# Patient Record
Sex: MALE | Race: WHITE | URBAN - METROPOLITAN AREA
[De-identification: names, ages, dates, MRNs, and addresses within clinical notes are randomized per-mention and may not be internally consistent; named-entity substitution may affect disease eponyms.]

---

## 2017-08-31 ENCOUNTER — OFFICE VISIT (OUTPATIENT)
Dept: PEDIATRICS | Facility: CLINIC | Age: 37
End: 2017-08-31
Payer: COMMERCIAL

## 2017-08-31 VITALS
SYSTOLIC BLOOD PRESSURE: 110 MMHG | HEIGHT: 73 IN | WEIGHT: 188 LBS | TEMPERATURE: 98.2 F | DIASTOLIC BLOOD PRESSURE: 78 MMHG | HEART RATE: 83 BPM | OXYGEN SATURATION: 97 % | BODY MASS INDEX: 24.92 KG/M2

## 2017-08-31 DIAGNOSIS — K21.9 GASTROESOPHAGEAL REFLUX DISEASE WITHOUT ESOPHAGITIS: Primary | ICD-10-CM

## 2017-08-31 PROCEDURE — 99214 OFFICE O/P EST MOD 30 MIN: CPT | Performed by: INTERNAL MEDICINE

## 2017-08-31 RX ORDER — FAMOTIDINE 20 MG/1
20 TABLET, FILM COATED ORAL 2 TIMES DAILY
Qty: 60 TABLET | Refills: 5 | Status: SHIPPED | OUTPATIENT
Start: 2017-08-31 | End: 2017-09-26

## 2017-08-31 NOTE — MR AVS SNAPSHOT
"              After Visit Summary   8/31/2017    Dean Deleon    MRN: [id number]           Patient Information     Date Of Birth          1980        Visit Information        Provider Department      8/31/2017 1:40 PM Kenan Oliveira MD [facility]        Today's Diagnoses     Gastroesophageal reflux disease without esophagitis    -  1      Care Instructions    Avoid alcohol, caffeine, tobacco, spicy foods, citrus foods, aspirin and anti-inflammatories like ibuprofen (\"Advil\" and \"Motrin\") or naproxen (\"Aleve\").     May begin pepcid AC 20 mg twice daily.    Follow up for a physical every 1-2 years.    Kenan Oliveira MD  Internal Medicine and Pediatrics           Follow-ups after your visit        Who to contact     If you have questions or need follow up information about today's clinic visit or your schedule please contact [facility] directly at [phone].  Normal or non-critical lab and imaging results will be communicated to you by to behart, letter or phone within 4 business days after the clinic has received the results. If you do not hear from us within 7 days, please contact the clinic through to behart or phone. If you have a critical or abnormal lab result, we will notify you by phone as soon as possible.  Submit refill requests through LearnSprout or call your pharmacy and they will forward the refill request to us. Please allow 3 business days for your refill to be completed.          Additional Information About Your Visit        to behart Information     LearnSprout gives you secure access to your electronic health record. If you see a primary care provider, you can also send messages to your care team and make appointments. If you have questions, please call your primary care clinic.  If you do not have a primary care provider, please call [phone] and they will assist you.        Care EveryWhere ID     This is your Care EveryWhere ID. This could be used by other " "organizations to access your [facility] medical records  [id number]        Your Vitals Were     Pulse Temperature Height Pulse Oximetry BMI (Body Mass Index)       83 98.2  F (36.8  C) (Oral) 6' 1\" (1.854 m) 97% 24.8 kg/m2        Blood Pressure from Last 3 Encounters:   08/31/17 110/78   05/05/14 122/68   02/01/14 118/58    Weight from Last 3 Encounters:   08/31/17 188 lb (85.3 kg)   05/05/14 160 lb (72.6 kg)   02/01/14 156 lb (70.8 kg)              Today, you had the following     No orders found for display         Today's Medication Changes          These changes are accurate as of: 8/31/17  2:14 PM.  If you have any questions, ask your nurse or doctor.               Start taking these medicines.        Dose/Directions    famotidine 20 MG tablet   Commonly known as:  PEPCID   Used for:  Gastroesophageal reflux disease without esophagitis   Started by:  Kenan Oliveira MD        Dose:  20 mg   Take 1 tablet (20 mg) by mouth 2 times daily   Quantity:  60 tablet   Refills:  5            Where to get your medicines      These medications were sent to [facility] - [address]  [address]  [address], [place] [address]     Phone:  [phone]     famotidine 20 MG tablet                Primary Care Provider Office Phone # Fax #    Anish Cas Mcknight [phone]       [address]  [facility] [address]        Equal Access to Services     [facility]: Hadii aad ku hadasho Soomaali, waaxda luqadaha, qaybta kaalmada adeegyada, waxneha edison jade . So [facility] [phone].    ATENCIÓN: Si habla español, tiene a mclaughlin disposición servicios gratuitos de asistencia lingüística. Llame al [phone].    We comply with applicable federal civil rights laws and [place] laws. We do not discriminate on the basis of race, color, national origin, age, disability sex, sexual orientation or gender identity.            Thank you!     Thank you for " choosing [facility]  for your care. Our goal is always to provide you with excellent care. Hearing back from our patients is one way we can continue to improve our services. Please take a few minutes to complete the written survey that you may receive in the mail after your visit with us. Thank you!             Your Updated Medication List - Protect others around you: Learn how to safely use, store and throw away your medicines at www.disposemymeds.org.          This list is accurate as of: 8/31/17  2:14 PM.  Always use your most recent med list.                   Brand Name Dispense Instructions for use Diagnosis    famotidine 20 MG tablet    PEPCID    60 tablet    Take 1 tablet (20 mg) by mouth 2 times daily    Gastroesophageal reflux disease without esophagitis

## 2017-08-31 NOTE — PROGRESS NOTES
SUBJECTIVE:   Dean Deleon is a 37 year old male who presents to clinic today for the following health issues:      Heartburn      Duration: Six months    Description (location/character/radiation): heartburn    Intensity:  moderate    Accompanying signs and symptoms: frequent throat clearing/cough. No globus sensation or heartburn at night time.   food getting stuck: no   nausea/vomiting/blood: no   abdominal pain: no   black/tarry or bloody stools: no :    History (similar episodes/previous evaluation): None    Precipitating or alleviating factors:  worse with caffeinated drinks and alcohol which he has decreased  current NSAID/Aspirin use: no     Therapies tried and outcome: OTC Prilosec somewhat helfpul, and Zantac (Ranitidine) and Pepcid (famotidine) not helpful        Has had some heartburn symptoms; this past year has worsened.  Feels heartburn in chest, no lower abd pain.  No known triggers.  Feels like coffee and alcohol sometimes worsen, but not all the time.  Cutting back on both of these has not made much of a difference.  Has tried zantac. Pepcid AC does help, and prilosec (omeprazole) does help.     Also has had no reflux symptoms at night.  Usually happens in the AM, or after lunch.      No vomiting or black stool.  No unexpected weight loss. No family history of gastroenterology cancer.     Problem list and histories reviewed & adjusted, as indicated.  Additional history: as documented    Patient Active Problem List   Diagnosis   (none) - all problems resolved or deleted     Past Surgical History:   Procedure Laterality Date     HC TOOTH EXTRACTION W/FORCEP         Social History   Substance Use Topics     Smoking status: Former Smoker     Packs/day: 0.50     Years: 2.00     Types: Cigarettes     Quit date: 9/6/2004     Smokeless tobacco: Never Used     Alcohol use 4.0 oz/week     8 Standard drinks or equivalent per week      Comment: occ/ about 2-6 drinks about 4/days/week      "Family History   Problem Relation Age of Onset     Hypertension Father      Depression Father      DIABETES Paternal Grandfather      type 2     Arthritis Paternal Grandfather      Alzheimer Disease Paternal Grandmother      Arthritis Paternal Grandmother      Arthritis Maternal Grandfather      JAQUELINE. Maternal Grandmother      Arthritis Maternal Grandmother          Current Outpatient Prescriptions   Medication Sig Dispense Refill     famotidine (PEPCID) 20 MG tablet Take 1 tablet (20 mg) by mouth 2 times daily 60 tablet 5     Allergies   Allergen Reactions     No Known Drug Allergies      BP Readings from Last 3 Encounters:   08/31/17 110/78   05/05/14 122/68   02/01/14 118/58    Wt Readings from Last 3 Encounters:   08/31/17 188 lb (85.3 kg)   05/05/14 160 lb (72.6 kg)   02/01/14 156 lb (70.8 kg)                  Labs reviewed in EPIC        Reviewed and updated as needed this visit by clinical staffTobacco  Allergies  Meds  Med Hx  Surg Hx  Fam Hx  Soc Hx      Reviewed and updated as needed this visit by Provider         ROS:  C: NEGATIVE for fever, chills, change in weight  E/M: NEGATIVE for ear, mouth and throat problems  R: NEGATIVE for significant cough or SOB  CV: NEGATIVE for chest pain, palpitations or peripheral edema    OBJECTIVE:                                                    /78 (BP Location: Right arm, Cuff Size: Adult Regular)  Pulse 83  Temp 98.2  F (36.8  C) (Oral)  Ht 6' 1\" (1.854 m)  Wt 188 lb (85.3 kg)  SpO2 97%  BMI 24.8 kg/m2  Body mass index is 24.8 kg/(m^2).   GENERAL: healthy, alert, well nourished, well hydrated, no distress  HENT: ear canals- normal; TMs- normal; Nose- normal; Mouth- no ulcers, no lesions  NECK: no tenderness, no adenopathy, no asymmetry, no masses, no stiffness; thyroid- normal to palpation  RESP: lungs clear to auscultation - no rales, no rhonchi, no wheezes  CV: regular rates and rhythm, normal S1 S2, no S3 or S4 and no murmur, no click or rub " "-  ABDOMEN: soft, no tenderness, no  hepatosplenomegaly, no masses, normal bowel sounds    Diagnostic test results:  Diagnostic Test Results:  none        ASSESSMENT/PLAN:                                                    1. Gastroesophageal reflux disease without esophagitis  Was told years ago had \"an ulcer\" from vicodin, but no esophagogastroduodenoscopy was done.  Advised H2 blocker daily for 3 months, followed by a trial off.  May also use proton pump inhibitor if H2 not working.  Avoid alcohol, caffeine, tobacco, spicy foods, citrus foods, aspirin and anti-inflammatories like ibuprofen (\"Advil\" and \"Motrin\") or naproxen (\"Aleve\").   Follow up for complete physcial exam this year.   Patient Instructions   Avoid alcohol, caffeine, tobacco, spicy foods, citrus foods, aspirin and anti-inflammatories like ibuprofen (\"Advil\" and \"Motrin\") or naproxen (\"Aleve\").     May begin pepcid AC 20 mg twice daily.    Follow up for a physical every 1-2 years.    Kenan Oliveira MD  Internal Medicine and Pediatrics      - famotidine (PEPCID) 20 MG tablet; Take 1 tablet (20 mg) by mouth 2 times daily  Dispense: 60 tablet; Refill: 5      See Patient Instructions    Kenan Oliveira MD  [facility]    "

## 2017-09-26 ENCOUNTER — OFFICE VISIT (OUTPATIENT)
Dept: PEDIATRICS | Facility: CLINIC | Age: 37
End: 2017-09-26
Payer: COMMERCIAL

## 2017-09-26 VITALS
HEART RATE: 103 BPM | BODY MASS INDEX: 24.52 KG/M2 | SYSTOLIC BLOOD PRESSURE: 104 MMHG | HEIGHT: 73 IN | TEMPERATURE: 99.8 F | WEIGHT: 185 LBS | OXYGEN SATURATION: 97 % | DIASTOLIC BLOOD PRESSURE: 70 MMHG

## 2017-09-26 DIAGNOSIS — R50.9 FEBRILE ILLNESS: Primary | ICD-10-CM

## 2017-09-26 PROCEDURE — 99213 OFFICE O/P EST LOW 20 MIN: CPT | Performed by: INTERNAL MEDICINE

## 2017-09-26 NOTE — PROGRESS NOTES
"  SUBJECTIVE:   Dean Deleon is a 37 year old male who presents to clinic today for the following health issues:      Pt presents with \"flu-like\" symptoms with fever (in clinic 99.8), chills/sweats, vomiting last night, and body aches.  No congestion or upper respiratory sx.  Concerned about Lyme Disease, has gone camping frequently this Summer.  Bite on left neck near collar bone, about a month a half ago from unknown insect.       Began 2 nights ago with dizziness and headache, followed by chills and bodyaches.  Lots of sweating, and last night, had some vomiting.  Has no thermometer.      First 10 hours, noted very significant fatigue; felt like had to lie down and could barely move.  Stayed home from work.   Yesterday was less tired.      Did have a bite on his left collarbone; not sure what it was.  No tick seen.      Took ibuprofen about 45 min ago. Got flu shot last week.  No known sick contacts.     Problem list and histories reviewed & adjusted, as indicated.  Additional history: as documented    Patient Active Problem List   Diagnosis   (none) - all problems resolved or deleted     Past Surgical History:   Procedure Laterality Date     HC TOOTH EXTRACTION W/FORCEP         Social History   Substance Use Topics     Smoking status: Former Smoker     Packs/day: 0.50     Years: 2.00     Types: Cigarettes     Quit date: 9/6/2004     Smokeless tobacco: Never Used     Alcohol use 4.0 oz/week     8 Standard drinks or equivalent per week      Comment: occ/ about 2-6 drinks about 4/days/week     Family History   Problem Relation Age of Onset     Hypertension Father      Depression Father      DIABETES Paternal Grandfather      type 2     Arthritis Paternal Grandfather      Alzheimer Disease Paternal Grandmother      Arthritis Paternal Grandmother      Arthritis Maternal Grandfather      C.A.D. Maternal Grandmother      Arthritis Maternal Grandmother          Current Outpatient Prescriptions " "  Medication Sig Dispense Refill     OMEPRAZOLE PO Take 20 mg by mouth every morning       Allergies   Allergen Reactions     No Known Drug Allergies      BP Readings from Last 3 Encounters:   09/26/17 104/70   08/31/17 110/78   05/05/14 122/68    Wt Readings from Last 3 Encounters:   09/26/17 185 lb (83.9 kg)   08/31/17 188 lb (85.3 kg)   05/05/14 160 lb (72.6 kg)                  Labs reviewed in EPIC        Reviewed and updated as needed this visit by clinical staffTobacco  Allergies  Meds  Med Hx  Surg Hx  Fam Hx  Soc Hx      Reviewed and updated as needed this visit by Provider         ROS:  C: NEGATIVE for fever, chills, change in weight  E/M: NEGATIVE for ear, mouth and throat problems  R: NEGATIVE for significant cough or SOB  CV: NEGATIVE for chest pain, palpitations or peripheral edema    OBJECTIVE:                                                    /70 (BP Location: Right arm, Cuff Size: Adult Regular)  Pulse 103  Temp 99.8  F (37.7  C) (Oral)  Ht 6' 1\" (1.854 m)  Wt 185 lb (83.9 kg)  SpO2 97%  BMI 24.41 kg/m2  Body mass index is 24.41 kg/(m^2).   GENERAL: healthy, alert, well nourished, well hydrated, no distress  HENT: ear canals- normal; TMs- normal; Nose- normal; Mouth- no ulcers, no lesions  NECK: no tenderness, no adenopathy, no asymmetry, no masses, no stiffness; thyroid- normal to palpation  RESP: lungs clear to auscultation - no rales, no rhonchi, no wheezes  CV: regular rates and rhythm, normal S1 S2, no S3 or S4 and no murmur, no click or rub -  ABDOMEN: soft, no tenderness, no  hepatosplenomegaly, no masses, normal bowel sounds    Diagnostic test results:  Diagnostic Test Results:  none        ASSESSMENT/PLAN:                                                    Febrile illness:  Likely viral.  No signs of serious bacterial infection.    No signs of lyme or mono.  No tick bites noted.     Patient Instructions   May take ibuprofen or tylenol.     Push fluids as needed.    Follow " up for any new symptoms or if fevers last beyond 7-10 days.    Kenan Oliveira MD  Internal Medicine and Pediatrics          See Patient Instructions    Kenan Oliveira MD  [facility]

## 2017-09-26 NOTE — PATIENT INSTRUCTIONS
May take ibuprofen or tylenol.     Push fluids as needed.    Follow up for any new symptoms or if fevers last beyond 7-10 days.    Kenan Oliveira MD  Internal Medicine and Pediatrics

## 2017-09-26 NOTE — NURSING NOTE
"Chief Complaint   Patient presents with     Generalized Body Aches       Initial /70 (BP Location: Right arm, Cuff Size: Adult Regular)  Pulse 103  Temp 99.8  F (37.7  C) (Oral)  Ht 6' 1\" (1.854 m)  Wt 185 lb (83.9 kg)  SpO2 97%  BMI 24.41 kg/m2 Estimated body mass index is 24.41 kg/(m^2) as calculated from the following:    Height as of this encounter: 6' 1\" (1.854 m).    Weight as of this encounter: 185 lb (83.9 kg).  Medication Reconciliation: complete     Ruth Marcus MA   September 26, 2017,  1:05 PM    "

## 2017-09-26 NOTE — LETTER
September 26, 2017      Dean Deleon  [address] [place] [address]        To Whom It May Concern:    Dean Deleon was seen in our clinic. He may return to work without restrictions as tolerated.      Sincerely,        Kenan Oliveira MD

## 2017-09-26 NOTE — MR AVS SNAPSHOT
"              After Visit Summary   9/26/2017    Dean Deleon    MRN: [id number]           Patient Information     Date Of Birth          1980        Visit Information        Provider Department      9/26/2017 1:00 PM [facility]        Care Instructions    May take ibuprofen or tylenol.     Push fluids as needed.    Follow up for any new symptoms or if fevers last beyond 7-10 days.    Kenan Oliveira MD  Internal Medicine and Pediatrics             Follow-ups after your visit        Who to contact     If you have questions or need follow up information about today's clinic visit or your schedule please contact [facility] directly at [phone].  Normal or non-critical lab and imaging results will be communicated to you by MyChart, letter or phone within 4 business days after the clinic has received the results. If you do not hear from us within 7 days, please contact the clinic through Oversihart or phone. If you have a critical or abnormal lab result, we will notify you by phone as soon as possible.  Submit refill requests through sCoolTV or call your pharmacy and they will forward the refill request to us. Please allow 3 business days for your refill to be completed.          Additional Information About Your Visit        MyChart Information     sCoolTV gives you secure access to your electronic health record. If you see a primary care provider, you can also send messages to your care team and make appointments. If you have questions, please call your primary care clinic.  If you do not have a primary care provider, please call [phone] and they will assist you.        Care EveryWhere ID     This is your Care EveryWhere ID. This could be used by other organizations to access your [facility] medical records  [id number]        Your Vitals Were     Pulse Temperature Height Pulse Oximetry BMI (Body Mass Index)       103 99.8  F (37.7  C) (Oral) 6' 1\" (1.854 " m) 97% 24.41 kg/m2        Blood Pressure from Last 3 Encounters:   09/26/17 104/70   08/31/17 110/78   05/05/14 122/68    Weight from Last 3 Encounters:   09/26/17 185 lb (83.9 kg)   08/31/17 188 lb (85.3 kg)   05/05/14 160 lb (72.6 kg)              Today, you had the following     No orders found for display       Primary Care Provider Office Phone # Fax #    Kenan Oliveira [phone] [address] [place] [address]        Equal Access to Services     [facility]: Hadii patricia tellez hadkenneth Sokarlene, wakarin haines, michelle kaalmagt zuñiga, ildefonso jade . So [facility] [phone].    ATENCIÓN: Si habla español, tiene a mclaughlin disposición servicios gratuitos de asistencia lingüística. Llame al [phone].    We comply with applicable federal civil rights laws and [place] laws. We do not discriminate on the basis of race, color, national origin, age, disability sex, sexual orientation or gender identity.            Thank you!     Thank you for choosing [facility]  for your care. Our goal is always to provide you with excellent care. Hearing back from our patients is one way we can continue to improve our services. Please take a few minutes to complete the written survey that you may receive in the mail after your visit with us. Thank you!             Your Updated Medication List - Protect others around you: Learn how to safely use, store and throw away your medicines at www.disposemymeds.org.          This list is accurate as of: 9/26/17  1:21 PM.  Always use your most recent med list.                   Brand Name Dispense Instructions for use Diagnosis    OMEPRAZOLE PO      Take 20 mg by mouth every morning

## 2017-09-29 ENCOUNTER — TELEPHONE (OUTPATIENT)
Dept: PEDIATRICS | Facility: CLINIC | Age: 37
End: 2017-09-29

## 2017-09-29 DIAGNOSIS — J02.0 ACUTE STREPTOCOCCAL PHARYNGITIS: Primary | ICD-10-CM

## 2017-09-29 RX ORDER — AMOXICILLIN 500 MG/1
500 CAPSULE ORAL 2 TIMES DAILY
Qty: 20 CAPSULE | Refills: 0 | Status: SHIPPED | OUTPATIENT
Start: 2017-09-29 | End: 2017-10-09

## 2017-09-29 NOTE — TELEPHONE ENCOUNTER
Pt notifies that he was seen for URI sx's on 09/26, still has the same sx's with ST(scratchy throat) from yesterday. His other sx's aren't better or worse. His 4 yr old daughter had similar sx's, took her to , she was tested positive for strep and has been on abx. So, pt would like to know whether he got strep. Requesting abx if possible. Is willing to stop by for a throat swab if needed.    Please advise. Need to notify pt. Pt can be reached at [phone](OK to ).    Notes from 09/26:  May take ibuprofen or tylenol.   Push fluids as needed.  Follow up for any new symptoms or if fevers last beyond 7-10 days.    Juan, RN  Triage Nurse

## 2017-11-06 ENCOUNTER — OFFICE VISIT (OUTPATIENT)
Dept: PEDIATRICS | Facility: CLINIC | Age: 37
End: 2017-11-06
Payer: COMMERCIAL

## 2017-11-06 VITALS
HEART RATE: 76 BPM | OXYGEN SATURATION: 96 % | HEIGHT: 73 IN | WEIGHT: 181 LBS | BODY MASS INDEX: 23.99 KG/M2 | TEMPERATURE: 98.2 F

## 2017-11-06 DIAGNOSIS — J01.01 ACUTE RECURRENT MAXILLARY SINUSITIS: Primary | ICD-10-CM

## 2017-11-06 PROCEDURE — 99214 OFFICE O/P EST MOD 30 MIN: CPT | Performed by: INTERNAL MEDICINE

## 2017-11-06 NOTE — MR AVS SNAPSHOT
"              After Visit Summary   11/6/2017    Dean Deleon    MRN: [id number]           Patient Information     Date Of Birth          1980        Visit Information        Provider Department      11/6/2017 2:20 PM Kenan Oliveira MD [facility]        Today's Diagnoses     Acute recurrent maxillary sinusitis    -  1      Care Instructions    Saline spray (nonmedicated salt water) in small squirt bottles can be used every hour or two during the day, as can humidifiers during the night.  Steam showers can help keep mucous loose.       Expectorants (like \"Mucinex\" or \"Robitussin\") may help, as can using cough supressants (like the \"DM\" in Mucinex DM and Robitussin DM).    Might benefit from antihistamines like Zyrtec (cetirizine) for relief of congestion.    Fill antibiotic if not better in next 3-5 days.    Kenan Oliveira MD  Internal Medicine and Pediatrics             Follow-ups after your visit        Who to contact     If you have questions or need follow up information about today's clinic visit or your schedule please contact [facility] directly at [phone].  Normal or non-critical lab and imaging results will be communicated to you by MyChart, letter or phone within 4 business days after the clinic has received the results. If you do not hear from us within 7 days, please contact the clinic through [employer] or phone. If you have a critical or abnormal lab result, we will notify you by phone as soon as possible.  Submit refill requests through [employer] or call your pharmacy and they will forward the refill request to us. Please allow 3 business days for your refill to be completed.          Additional Information About Your Visit        MyChart Information     [employer] gives you secure access to your electronic health record. If you see a primary care provider, you can also send messages to your care team and make appointments. If you have questions, please call your " "primary care clinic.  If you do not have a primary care provider, please call [phone] and they will assist you.        Care EveryWhere ID     This is your Care EveryWhere ID. This could be used by other organizations to access your [place] medical records  [id number]        Your Vitals Were     Pulse Temperature Height Pulse Oximetry BMI (Body Mass Index)       76 98.2  F (36.8  C) (Oral) 6' 1\" (1.854 m) 96% 23.88 kg/m2        Blood Pressure from Last 3 Encounters:   09/26/17 104/70   08/31/17 110/78   05/05/14 122/68    Weight from Last 3 Encounters:   11/06/17 181 lb (82.1 kg)   09/26/17 185 lb (83.9 kg)   08/31/17 188 lb (85.3 kg)              Today, you had the following     No orders found for display         Today's Medication Changes          These changes are accurate as of: 11/6/17  2:48 PM.  If you have any questions, ask your nurse or doctor.               Start taking these medicines.        Dose/Directions    amoxicillin-clavulanate 875-125 MG per tablet   Commonly known as:  AUGMENTIN   Used for:  Acute recurrent maxillary sinusitis   Started by:  Kenan Oliveira MD        Dose:  1 tablet   Take 1 tablet by mouth 2 times daily   Quantity:  28 tablet   Refills:  0            Where to get your medicines      Some of these will need a paper prescription and others can be bought over the counter.  Ask your nurse if you have questions.     Bring a paper prescription for each of these medications     amoxicillin-clavulanate 875-125 MG per tablet                Primary Care Provider Office Phone # Fax #    Kenan Oliveira [phone] [address] [place] [address]        Equal Access to Services     [facility]: Hadii patricia pickens Sokarlene, waaxda luqadaha, qaybta kaalmada ildefonso zuñiga. So [facility] [phone].    ATENCIÓN: Si habla español, tiene a mclaughlin disposición servicios gratuitos de asistencia lingüística. Llame al " [phone].    We comply with applicable federal civil rights laws and [place] laws. We do not discriminate on the basis of race, color, national origin, age, disability, sex, sexual orientation, or gender identity.            Thank you!     Thank you for choosing [facility]  for your care. Our goal is always to provide you with excellent care. Hearing back from our patients is one way we can continue to improve our services. Please take a few minutes to complete the written survey that you may receive in the mail after your visit with us. Thank you!             Your Updated Medication List - Protect others around you: Learn how to safely use, store and throw away your medicines at www.disposemymeds.org.          This list is accurate as of: 11/6/17  2:48 PM.  Always use your most recent med list.                   Brand Name Dispense Instructions for use Diagnosis    amoxicillin-clavulanate 875-125 MG per tablet    AUGMENTIN    28 tablet    Take 1 tablet by mouth 2 times daily    Acute recurrent maxillary sinusitis       OMEPRAZOLE PO      Take 20 mg by mouth every morning

## 2017-11-06 NOTE — PATIENT INSTRUCTIONS
"Saline spray (nonmedicated salt water) in small squirt bottles can be used every hour or two during the day, as can humidifiers during the night.  Steam showers can help keep mucous loose.       Expectorants (like \"Mucinex\" or \"Robitussin\") may help, as can using cough supressants (like the \"DM\" in Mucinex DM and Robitussin DM).    Might benefit from antihistamines like Zyrtec (cetirizine) for relief of congestion.    Fill antibiotic if not better in next 3-5 days.    Kenan Oliveira MD  Internal Medicine and Pediatrics     "

## 2017-11-06 NOTE — PROGRESS NOTES
"  SUBJECTIVE:   Dean Deleon is a 37 year old male who presents to clinic today for the following health issues:      RESPIRATORY SYMPTOMS      Duration: nine days    Description  nasal congestion, rhinorrhea, facial pain/pressure, headache and bilateral ear pressure    Severity: moderate    Accompanying signs and symptoms: None    History (predisposing factors):  none    Precipitating or alleviating factors: None    Therapies tried and outcome:  Antihistamine - somewhat helpful, saline sprays, and hot shower        Began about 9 or 10 days ago; sinus congestion. Doing saline, nettipots, and humidifiers.  3-4 days ago began to have worsening ear congestion, worse on left.  Cannot hear at work.  Nose 'feels better.\"  No fevers.  No significant headache or facial pain.  Just tightness over maxillae.  Did have some frontal sinus pain, brief.     Poor sleep.  No other sick contacts.  Daughter with milder upper respiratory infection.     No smoking.  GOt flu shot.     Problem list and histories reviewed & adjusted, as indicated.  Additional history: as documented    Patient Active Problem List   Diagnosis   (none) - all problems resolved or deleted     Past Surgical History:   Procedure Laterality Date     HC TOOTH EXTRACTION W/FORCEP         Social History   Substance Use Topics     Smoking status: Former Smoker     Packs/day: 0.50     Years: 2.00     Types: Cigarettes     Quit date: 9/6/2004     Smokeless tobacco: Never Used     Alcohol use 4.0 oz/week     8 Standard drinks or equivalent per week      Comment: occ/ about 2-6 drinks about 4/days/week     Family History   Problem Relation Age of Onset     Hypertension Father      Depression Father      DIABETES Paternal Grandfather      type 2     Arthritis Paternal Grandfather      Alzheimer Disease Paternal Grandmother      Arthritis Paternal Grandmother      Arthritis Maternal Grandfather      C.A.D. Maternal Grandmother      Arthritis Maternal " "Grandmother          Current Outpatient Prescriptions   Medication Sig Dispense Refill     amoxicillin-clavulanate (AUGMENTIN) 875-125 MG per tablet Take 1 tablet by mouth 2 times daily 28 tablet 0     OMEPRAZOLE PO Take 20 mg by mouth every morning       Allergies   Allergen Reactions     No Known Drug Allergies      BP Readings from Last 3 Encounters:   09/26/17 104/70   08/31/17 110/78   05/05/14 122/68    Wt Readings from Last 3 Encounters:   11/06/17 181 lb (82.1 kg)   09/26/17 185 lb (83.9 kg)   08/31/17 188 lb (85.3 kg)                  Labs reviewed in EPIC        Reviewed and updated as needed this visit by clinical staff     Reviewed and updated as needed this visit by Provider         ROS:  C: NEGATIVE for fever, chills, change in weight  E/M: NEGATIVE for ear, mouth and throat problems  R: NEGATIVE for significant cough or SOB  CV: NEGATIVE for chest pain, palpitations or peripheral edema    OBJECTIVE:                                                    Pulse 76  Temp 98.2  F (36.8  C) (Oral)  Ht 6' 1\" (1.854 m)  Wt 181 lb (82.1 kg)  SpO2 96%  BMI 23.88 kg/m2  Body mass index is 23.88 kg/(m^2).   GENERAL: healthy, alert, well nourished, well hydrated, no distress  HENT: ear canals- normal; TMs- normal; Nose- normal; Mouth- no ulcers, no lesions  NECK: no tenderness, no adenopathy, no asymmetry, no masses, no stiffness; thyroid- normal to palpation  RESP: lungs clear to auscultation - no rales, no rhonchi, no wheezes  CV: regular rates and rhythm, normal S1 S2, no S3 or S4 and no murmur, no click or rub -  ABDOMEN: soft, no tenderness, no  hepatosplenomegaly, no masses, normal bowel sounds    Diagnostic test results:  Diagnostic Test Results:  none        ASSESSMENT/PLAN:                                                    1. Acute recurrent maxillary sinusitis  Slowly improving . Would continue conservative management unless symptoms worsen in next 3-5 days.   Patient Instructions   Saline spray " "(nonmedicated salt water) in small squirt bottles can be used every hour or two during the day, as can humidifiers during the night.  Steam showers can help keep mucous loose.       Expectorants (like \"Mucinex\" or \"Robitussin\") may help, as can using cough supressants (like the \"DM\" in Mucinex DM and Robitussin DM).    Might benefit from antihistamines like Zyrtec (cetirizine) for relief of congestion.    Fill antibiotic if not better in next 3-5 days.    Kenan Oliveira MD  Internal Medicine and Pediatrics        - amoxicillin-clavulanate (AUGMENTIN) 875-125 MG per tablet; Take 1 tablet by mouth 2 times daily  Dispense: 28 tablet; Refill: 0      See Patient Instructions    Kenan Oliveira MD  [facility]  "

## 2017-11-06 NOTE — NURSING NOTE
"Chief Complaint   Patient presents with     RECHECK     URI       Initial Pulse 76  Temp 98.2  F (36.8  C) (Oral)  Ht 6' 1\" (1.854 m)  Wt 181 lb (82.1 kg)  SpO2 96%  BMI 23.88 kg/m2 Estimated body mass index is 23.88 kg/(m^2) as calculated from the following:    Height as of this encounter: 6' 1\" (1.854 m).    Weight as of this encounter: 181 lb (82.1 kg).  Medication Reconciliation: complete     Ruth Marcus MA   November 6, 2017,  2:33 PM    "

## 2017-11-14 ENCOUNTER — TRANSFERRED RECORDS (OUTPATIENT)
Dept: HEALTH INFORMATION MANAGEMENT | Facility: CLINIC | Age: 37
End: 2017-11-14

## 2017-11-20 ENCOUNTER — TELEPHONE (OUTPATIENT)
Dept: PEDIATRICS | Facility: CLINIC | Age: 37
End: 2017-11-20

## 2017-11-20 DIAGNOSIS — J01.01 ACUTE RECURRENT MAXILLARY SINUSITIS: ICD-10-CM

## 2017-11-20 NOTE — TELEPHONE ENCOUNTER
Patient calling that he was given a prescription for an antibiotic but has lost it and wants a refill sent to his pharmacy. Symptoms have not gotten worse but have not gotten better.  [phone]  Kathleen De Leon RN

## 2017-11-21 ENCOUNTER — OFFICE VISIT (OUTPATIENT)
Dept: PEDIATRICS | Facility: CLINIC | Age: 37
End: 2017-11-21
Payer: COMMERCIAL

## 2017-11-21 VITALS
HEIGHT: 73 IN | BODY MASS INDEX: 23.99 KG/M2 | SYSTOLIC BLOOD PRESSURE: 122 MMHG | DIASTOLIC BLOOD PRESSURE: 89 MMHG | TEMPERATURE: 98.1 F | OXYGEN SATURATION: 98 % | HEART RATE: 95 BPM | WEIGHT: 181 LBS

## 2017-11-21 DIAGNOSIS — M25.512 ACUTE PAIN OF LEFT SHOULDER: Primary | ICD-10-CM

## 2017-11-21 PROCEDURE — 99214 OFFICE O/P EST MOD 30 MIN: CPT | Performed by: INTERNAL MEDICINE

## 2017-11-21 RX ORDER — HYDROCODONE BITARTRATE AND ACETAMINOPHEN 5; 325 MG/1; MG/1
1-2 TABLET ORAL EVERY 6 HOURS PRN
Qty: 15 TABLET | Refills: 0 | Status: SHIPPED | OUTPATIENT
Start: 2017-11-21 | End: 2018-02-19

## 2017-11-21 NOTE — MR AVS SNAPSHOT
After Visit Summary   11/21/2017    Dean Deleon    MRN: [id number]           Patient Information     Date Of Birth          1980        Visit Information        Provider Department      11/21/2017 9:40 AM Kenan Oliveira MD [facility]        Today's Diagnoses     Acute pain of left shoulder    -  1      Care Instructions    Begin high dose Aleve (naproxen) 440 mg twice daily for 2 solid weeks, then may back down to 220 mg twice daily as needed.    May take hydrocodone/acetaminophen as needed for nighttime; no driving.    Call physical therapy for an appointment.    Kenan Oliveira MD  Internal Medicine and Pediatrics             Follow-ups after your visit        Additional Services     [facility] PT, HAND, AND CHIROPRACTIC REFERRAL       **This order will print in the [facility] Scheduling Office**    Physical Therapy, Hand Therapy and Chiropractic Care are available through:    *[facility]  *[facility]  *[facility]    Call one number to schedule at any of the above locations: [phone].    Your provider has referred you to: Physical Therapy at [facility] or [facility]    Indication/Reason for Referral: Shoulder Pain  Onset of Illness: 3 days  Therapy Orders: Evaluate and Treat  Special Programs: None  Special Request: None    Benedicto Diane      Additional Comments for the Therapist or Chiropractor:     Please be aware that coverage of these services is subject to the terms and limitations of your health insurance plan.  Call member services at your health plan with any benefit or coverage questions.      Please bring the following to your appointment:    *Your personal calendar for scheduling future appointments  *Comfortable clothing                  Who to contact     If you have questions or need follow up information about today's clinic visit or your schedule please contact [facility] directly at [phone].  Normal or  "non-critical lab and imaging results will be communicated to you by MyChart, letter or phone within 4 business days after the clinic has received the results. If you do not hear from us within 7 days, please contact the clinic through [employer] or phone. If you have a critical or abnormal lab result, we will notify you by phone as soon as possible.  Submit refill requests through [employer] or call your pharmacy and they will forward the refill request to us. Please allow 3 business days for your refill to be completed.          Additional Information About Your Visit        [employer] Information     [employer] gives you secure access to your electronic health record. If you see a primary care provider, you can also send messages to your care team and make appointments. If you have questions, please call your primary care clinic.  If you do not have a primary care provider, please call [phone] and they will assist you.        Care EveryWhere ID     This is your Care EveryWhere ID. This could be used by other organizations to access your [facility] medical records  [id number]        Your Vitals Were     Pulse Temperature Height Pulse Oximetry BMI (Body Mass Index)       95 98.1  F (36.7  C) (Oral) 6' 1\" (1.854 m) 98% 23.88 kg/m2        Blood Pressure from Last 3 Encounters:   11/21/17 122/89   09/26/17 104/70   08/31/17 110/78    Weight from Last 3 Encounters:   11/21/17 181 lb (82.1 kg)   11/06/17 181 lb (82.1 kg)   09/26/17 185 lb (83.9 kg)              We Performed the Following     [facility], AND CHIROPRACTIC REFERRAL          Today's Medication Changes          These changes are accurate as of: 11/21/17  9:53 AM.  If you have any questions, ask your nurse or doctor.               Start taking these medicines.        Dose/Directions    HYDROcodone-acetaminophen 5-325 MG per tablet   Commonly known as:  NORCO   Used for:  Acute pain of left shoulder   Started by:  Kenan Oliveira MD        Dose:  1-2 tablet   Take " 1-2 tablets by mouth every 6 hours as needed for moderate to severe pain maximum 2 tablet(s) per day   Quantity:  15 tablet   Refills:  0            Where to get your medicines      Some of these will need a paper prescription and others can be bought over the counter.  Ask your nurse if you have questions.     Bring a paper prescription for each of these medications     HYDROcodone-acetaminophen 5-325 MG per tablet                Primary Care Provider Office Phone # Fax #    Kenan Oliveira [phone] [address] [place] [address]        Equal Access to Services     [facility]: Hadii patricia tellez hadasho Soomaali, waaxda luqadaha, qaybta kaalmada adeegyagt, ildefonso jade . So [facility] [phone].    ATENCIÓN: Si habla español, tiene a mclaughlin disposición servicios gratuitos de asistencia lingüística. Ll[facility] [phone].    We comply with applicable federal civil rights laws and [place] laws. We do not discriminate on the basis of race, color, national origin, age, disability, sex, sexual orientation, or gender identity.            Thank you!     [facility]. Our goal is always to provide you with excellent care. Hearing back from our patients is one way we can continue to improve our services. Please take a few minutes to complete the written survey that you may receive in the mail after your visit with us. Thank you!             Your Updated Medication List - Protect others around you: Learn how to safely use, store and throw away your medicines at www.disposemymeds.org.          This list is accurate as of: 11/21/17  9:53 AM.  Always use your most recent med list.                   Brand Name Dispense Instructions for use Diagnosis    amoxicillin-clavulanate 875-125 MG per tablet    AUGMENTIN    28 tablet    Take 1 tablet by mouth 2 times daily    Acute recurrent maxillary sinusitis        HYDROcodone-acetaminophen 5-325 MG per tablet    NORCO    15 tablet    Take 1-2 tablets by mouth every 6 hours as needed for moderate to severe pain maximum 2 tablet(s) per day    Acute pain of left shoulder       OMEPRAZOLE PO      Take 20 mg by mouth every morning

## 2017-11-21 NOTE — PROGRESS NOTES
SUBJECTIVE:   Dean Deleon is a 37 year old male who presents to clinic today for the following health issues:      Injury to shoulder      Duration: three days    Description (location/character/radiation): left shoulder pain    Intensity:  moderate    Accompanying signs and symptoms: can feel pain with breathing, sniffing, or sneezing. Sleeping is difficult with pain    History (similar episodes/previous evaluation): None    Precipitating or alleviating factors: injured moving furniture a few days ago    Therapies tried and outcome: Ibuprofen helpful         3 days ago, moving furniture.  Did not notice pain right away; but later that day noted pain in left anterior shoulder.  Pain grew slowly and worsened; feels worse today.  Has range of motion, but cannot lift much.  Sleeping is painful. When reclines, will feel pain there.  No numbness or tingling.  No history of trauma.      Problem list and histories reviewed & adjusted, as indicated.  Additional history: as documented    Patient Active Problem List   Diagnosis   (none) - all problems resolved or deleted     Past Surgical History:   Procedure Laterality Date     HC TOOTH EXTRACTION W/FORCEP         Social History   Substance Use Topics     Smoking status: Former Smoker     Packs/day: 0.50     Years: 2.00     Types: Cigarettes     Quit date: 9/6/2004     Smokeless tobacco: Never Used     Alcohol use 4.0 oz/week     8 Standard drinks or equivalent per week      Comment: occ/ about 2-6 drinks about 4/days/week     Family History   Problem Relation Age of Onset     Hypertension Father      Depression Father      DIABETES Paternal Grandfather      type 2     Arthritis Paternal Grandfather      Alzheimer Disease Paternal Grandmother      Arthritis Paternal Grandmother      Arthritis Maternal Grandfather      C.A.D. Maternal Grandmother      Arthritis Maternal Grandmother          Current Outpatient Prescriptions   Medication Sig Dispense Refill  "    HYDROcodone-acetaminophen (NORCO) 5-325 MG per tablet Take 1-2 tablets by mouth every 6 hours as needed for moderate to severe pain maximum 2 tablet(s) per day 15 tablet 0     amoxicillin-clavulanate (AUGMENTIN) 875-125 MG per tablet Take 1 tablet by mouth 2 times daily 28 tablet 0     OMEPRAZOLE PO Take 20 mg by mouth every morning       Allergies   Allergen Reactions     No Known Drug Allergies      BP Readings from Last 3 Encounters:   11/21/17 122/89   09/26/17 104/70   08/31/17 110/78    Wt Readings from Last 3 Encounters:   11/21/17 181 lb (82.1 kg)   11/06/17 181 lb (82.1 kg)   09/26/17 185 lb (83.9 kg)                  Labs reviewed in EPIC        Reviewed and updated as needed this visit by clinical staffTobacco  Allergies  Meds  Med Hx  Surg Hx  Fam Hx  Soc Hx      Reviewed and updated as needed this visit by Provider         ROS:  C: NEGATIVE for fever, chills, change in weight  E/M: NEGATIVE for ear, mouth and throat problems  R: NEGATIVE for significant cough or SOB  CV: NEGATIVE for chest pain, palpitations or peripheral edema    OBJECTIVE:                                                    /89  Pulse 95  Temp 98.1  F (36.7  C) (Oral)  Ht 6' 1\" (1.854 m)  Wt 181 lb (82.1 kg)  SpO2 98%  BMI 23.88 kg/m2  Body mass index is 23.88 kg/(m^2).   GENERAL: healthy, alert, well nourished, well hydrated, no distress  HENT: ear canals- normal; TMs- normal; Nose- normal; Mouth- no ulcers, no lesions  NECK: no tenderness, no adenopathy, no asymmetry, no masses, no stiffness; thyroid- normal to palpation  RESP: lungs clear to auscultation - no rales, no rhonchi, no wheezes  CV: regular rates and rhythm, normal S1 S2, no S3 or S4 and no murmur, no click or rub -  ABDOMEN: soft, no tenderness, no  hepatosplenomegaly, no masses, normal bowel sounds  MS:  Pain along left anterior shoulder; full range of motion; no evidence of anterior subluxation or dislocation.  Shoulder Exam: Negative empty can " test, supraspinatus, infraspinatus, teres minor, and subscapularis tests.  Negative Neer's/Hawkin's test.  Full range of motion to external and internal rotation.  No bicipital tendon pain.  Normal, symmetric biceps muscles.     Diagnostic test results:  Diagnostic Test Results:  none        ASSESSMENT/PLAN:                                                    1. Acute pain of left shoulder  Patient Instructions   Begin high dose Aleve (naproxen) 440 mg twice daily for 2 solid weeks, then may back down to 220 mg twice daily as needed.    May take hydrocodone/acetaminophen as needed for nighttime; no driving.    Call physical therapy for an appointment.    Kenan Oliveira MD  Internal Medicine and Pediatrics        - [facility] PT, HAND, AND CHIROPRACTIC REFERRAL  - HYDROcodone-acetaminophen (NORCO) 5-325 MG per tablet; Take 1-2 tablets by mouth every 6 hours as needed for moderate to severe pain maximum 2 tablet(s) per day  Dispense: 15 tablet; Refill: 0      See Patient Instructions    Kenan Oliveira MD  [facility]

## 2017-11-21 NOTE — PATIENT INSTRUCTIONS
Begin high dose Aleve (naproxen) 440 mg twice daily for 2 solid weeks, then may back down to 220 mg twice daily as needed.    May take hydrocodone/acetaminophen as needed for nighttime; no driving.    Call physical therapy for an appointment.    Kenan Oliveira MD  Internal Medicine and Pediatrics

## 2017-11-21 NOTE — NURSING NOTE
"Chief Complaint   Patient presents with     Shoulder Injury     Left       Initial /90 (BP Location: Right arm, Cuff Size: Adult Large)  Pulse 95  Temp 98.1  F (36.7  C) (Oral)  Ht 6' 1\" (1.854 m)  Wt 181 lb (82.1 kg)  SpO2 98%  BMI 23.88 kg/m2 Estimated body mass index is 23.88 kg/(m^2) as calculated from the following:    Height as of this encounter: 6' 1\" (1.854 m).    Weight as of this encounter: 181 lb (82.1 kg).  Medication Reconciliation: complete     Ruth Marcus MA   November 21, 2017,  9:43 AM    "

## 2018-02-08 ENCOUNTER — TRANSFERRED RECORDS (OUTPATIENT)
Dept: HEALTH INFORMATION MANAGEMENT | Facility: CLINIC | Age: 38
End: 2018-02-08

## 2018-02-19 ENCOUNTER — OFFICE VISIT (OUTPATIENT)
Dept: PEDIATRICS | Facility: CLINIC | Age: 38
End: 2018-02-19
Payer: COMMERCIAL

## 2018-02-19 VITALS
TEMPERATURE: 98 F | WEIGHT: 183.4 LBS | OXYGEN SATURATION: 98 % | HEIGHT: 73 IN | BODY MASS INDEX: 24.31 KG/M2 | SYSTOLIC BLOOD PRESSURE: 102 MMHG | HEART RATE: 79 BPM | DIASTOLIC BLOOD PRESSURE: 66 MMHG

## 2018-02-19 DIAGNOSIS — K21.00 GASTROESOPHAGEAL REFLUX DISEASE WITH ESOPHAGITIS: Primary | ICD-10-CM

## 2018-02-19 PROCEDURE — 99214 OFFICE O/P EST MOD 30 MIN: CPT | Performed by: FAMILY MEDICINE

## 2018-02-19 NOTE — MR AVS SNAPSHOT
After Visit Summary   2/19/2018    Dean Deleon    MRN: [id number]           Patient Information     Date Of Birth          1980        Visit Information        Provider Department      2/19/2018 11:00 AM Modesto Medina MD [facility]        Today's Diagnoses     Gastroesophageal reflux disease with esophagitis    -  1       Follow-ups after your visit        Additional Services     GASTROENTEROLOGY ADULT REF PROCEDURE ONLY       Last Lab Result: No results found for: CR  Body mass index is 24.2 kg/(m^2).     Needed:  No  Language:  English    Patient will be contacted to schedule procedure.     Please be aware that coverage of these services is subject to the terms and limitations of your health insurance plan.  Call member services at your health plan with any benefit or coverage questions.  Any procedures must be performed at a [place] facility OR coordinated by your clinic's referral office.    Please bring the following with you to your appointment:    (1) Any X-Rays, CTs or MRIs which have been performed.  Contact the facility where they were done to arrange for  prior to your scheduled appointment.    (2) List of current medications   (3) This referral request   (4) Any documents/labs given to you for this referral                  Who to contact     If you have questions or need follow up information about today's clinic visit or your schedule please contact [facility] directly at [phone].  Normal or non-critical lab and imaging results will be communicated to you by MyChart, letter or phone within 4 business days after the clinic has received the results. If you do not hear from us within 7 days, please contact the clinic through MyChart or phone. If you have a critical or abnormal lab result, we will notify you by phone as soon as possible.  Submit refill requests through [employer] or call your pharmacy and they will forward  "the refill request to us. Please allow 3 business days for your refill to be completed.          Additional Information About Your Visit        [employer] Information     [employer] gives you secure access to your electronic health record. If you see a primary care provider, you can also send messages to your care team and make appointments. If you have questions, please call your primary care clinic.  If you do not have a primary care provider, please call [phone] and they will assist you.        Care EveryWhere ID     This is your Care EveryWhere ID. This could be used by other organizations to access your [facility] medical records  [id number]        Your Vitals Were     Pulse Temperature Height Pulse Oximetry BMI (Body Mass Index)       79 98  F (36.7  C) (Oral) 6' 1\" (1.854 m) 98% 24.2 kg/m2        Blood Pressure from Last 3 Encounters:   02/19/18 102/66   11/21/17 122/89   09/26/17 104/70    Weight from Last 3 Encounters:   02/19/18 183 lb 6.4 oz (83.2 kg)   11/21/17 181 lb (82.1 kg)   11/06/17 181 lb (82.1 kg)              We Performed the Following     GASTROENTEROLOGY ADULT REF PROCEDURE ONLY        Primary Care Provider Office Phone # Fax #    Kenan Oliveira [phone] [address] [place] [address]        Equal Access to Services     [facility]: Hadii aad ku hadasho Soomaali, waaxda luqadaha, qaybta kaalmada zara, ildefonso jade . So [facility] [phone].    ATENCIÓN: Si habla español, tiene a mclaughlin disposición servicios gratuitos de asistencia lingüística. Llame al [phone].    We comply with applicable federal civil rights laws and [place] laws. We do not discriminate on the basis of race, color, national origin, age, disability, sex, sexual orientation, or gender identity.            Thank you!     [facility]. Our goal is always to provide you with excellent care. Hearing back from " our patients is one way we can continue to improve our services. Please take a few minutes to complete the written survey that you may receive in the mail after your visit with us. Thank you!             Your Updated Medication List - Protect others around you: Learn how to safely use, store and throw away your medicines at www.disposemymeds.org.      Notice  As of 2/19/2018 11:33 AM    You have not been prescribed any medications.

## 2018-02-19 NOTE — PROGRESS NOTES
"  SUBJECTIVE:   Dean Deleon is a 37 year old male who presents to clinic today for the following health issues:    Concern - Hernia  Onset: 7 months ago    Description:   Upper abdominal    Intensity: moderate    Progression of Symptoms:  same    Accompanying Signs & Symptoms:  Crackling sound, lump at tip of sternum on rt  side    Previous history of similar problem:   no    Precipitating factors:   Worsened by: none    Alleviating factors:  Improved by: none    Therapies Tried and outcome: omeprazole    ROS:  General, neuro, sleep, psych, musculoskeletal system otherwise negative.    /66 (BP Location: Right arm, Cuff Size: Adult Large)  Pulse 79  Temp 98  F (36.7  C) (Oral)  Ht 6' 1\" (1.854 m)  Wt 183 lb 6.4 oz (83.2 kg)  SpO2 98%  BMI 24.2 kg/m2    GENERAL: healthy, alert and no distress  EYES: Eyes grossly normal to inspection, PERRL and conjunctivae and sclerae normal  HENT: ear canals and TM's normal, nose and mouth without ulcers or lesions  NECK: no adenopathy, no asymmetry, masses, or scars and thyroid normal to palpation  RESP: lungs clear to auscultation - no rales, rhonchi or wheezes  CV: regular rate and rhythm, normal S1 S2, no S3 or S4, no murmur, click or rub, no peripheral edema and peripheral pulses strong  MS: no gross musculoskeletal defects noted, no edema  SKIN: no suspicious lesions or rashes  NEURO: Normal strength and tone, mentation intact and speech normal  PSYCH: mentation appears normal, affect normal/bright  ABD: s, nt, nd, nabs    ASSESSMENT:  1. Gastroesophageal reflux disease with esophagitis  Given duration will obtain EGD.       Greater than 25 minutes spent with patient and family discussing risks and benefits and management with possible outcomes regarding this issue with greater than 50% in counseling for medical decision making and coordination of care.    - GASTROENTEROLOGY ADULT REF PROCEDURE ONLY   "

## 2018-02-19 NOTE — NURSING NOTE
"Chief Complaint   Patient presents with     Hernia       Initial /66 (BP Location: Right arm, Cuff Size: Adult Large)  Pulse 79  Temp 98  F (36.7  C) (Oral)  Ht 6' 1\" (1.854 m)  Wt 183 lb 6.4 oz (83.2 kg)  SpO2 98%  BMI 24.2 kg/m2 Estimated body mass index is 24.2 kg/(m^2) as calculated from the following:    Height as of this encounter: 6' 1\" (1.854 m).    Weight as of this encounter: 183 lb 6.4 oz (83.2 kg).  Medication Reconciliation: complete   Aguilar Solomon CMA      "

## 2018-02-26 ENCOUNTER — TRANSFERRED RECORDS (OUTPATIENT)
Dept: HEALTH INFORMATION MANAGEMENT | Facility: CLINIC | Age: 38
End: 2018-02-26

## 2018-04-04 ENCOUNTER — TRANSFERRED RECORDS (OUTPATIENT)
Dept: HEALTH INFORMATION MANAGEMENT | Facility: CLINIC | Age: 38
End: 2018-04-04

## 2018-04-20 ENCOUNTER — OFFICE VISIT (OUTPATIENT)
Dept: PEDIATRICS | Facility: CLINIC | Age: 38
End: 2018-04-20
Payer: COMMERCIAL

## 2018-04-20 VITALS
HEIGHT: 73 IN | HEART RATE: 81 BPM | WEIGHT: 180 LBS | BODY MASS INDEX: 23.86 KG/M2 | TEMPERATURE: 98 F | SYSTOLIC BLOOD PRESSURE: 116 MMHG | DIASTOLIC BLOOD PRESSURE: 62 MMHG | OXYGEN SATURATION: 97 %

## 2018-04-20 DIAGNOSIS — L73.8 HOT TUB FOLLICULITIS: Primary | ICD-10-CM

## 2018-04-20 DIAGNOSIS — Z23 NEED FOR PROPHYLACTIC VACCINATION WITH TETANUS-DIPHTHERIA (TD): ICD-10-CM

## 2018-04-20 PROCEDURE — 99213 OFFICE O/P EST LOW 20 MIN: CPT | Mod: 25 | Performed by: INTERNAL MEDICINE

## 2018-04-20 PROCEDURE — 90471 IMMUNIZATION ADMIN: CPT | Performed by: INTERNAL MEDICINE

## 2018-04-20 PROCEDURE — 90715 TDAP VACCINE 7 YRS/> IM: CPT | Performed by: INTERNAL MEDICINE

## 2018-04-20 RX ORDER — CIPROFLOXACIN 500 MG/1
500 TABLET, FILM COATED ORAL 2 TIMES DAILY
Qty: 14 TABLET | Refills: 0 | Status: SHIPPED | OUTPATIENT
Start: 2018-04-20

## 2018-04-20 NOTE — MR AVS SNAPSHOT
After Visit Summary   4/20/2018    Dean Deleon    MRN: [id number]           Patient Information     Date Of Birth          1980        Visit Information        Provider Department      4/20/2018 3:20 PM Kenan Oliveira MD [facility]        Today's Diagnoses     Hot tub folliculitis    -  1    Need for prophylactic vaccination with tetanus-diphtheria (TD)          Care Instructions    Begin 7 days of twice daily cipro.    Tetanus shot today.    Kenan Oliveira MD  Internal Medicine and Pediatrics             Follow-ups after your visit        Follow-up notes from your care team     Return in about 1 year (around 4/20/2019) for Physical Exam.      Who to contact     If you have questions or need follow up information about today's clinic visit or your schedule please contact [facility] directly at [phone].  Normal or non-critical lab and imaging results will be communicated to you by MyChart, letter or phone within 4 business days after the clinic has received the results. If you do not hear from us within 7 days, please contact the clinic through [employer] or phone. If you have a critical or abnormal lab result, we will notify you by phone as soon as possible.  Submit refill requests through [employer] or call your pharmacy and they will forward the refill request to us. Please allow 3 business days for your refill to be completed.          Additional Information About Your Visit        MyChart Information     [employer] gives you secure access to your electronic health record. If you see a primary care provider, you can also send messages to your care team and make appointments. If you have questions, please call your primary care clinic.  If you do not have a primary care provider, please call [phone] and they will assist you.        Care EveryWhere ID     This is your Care EveryWhere ID. This could be used by other organizations to access your [place]  "medical records  [id number]        Your Vitals Were     Pulse Temperature Height Pulse Oximetry BMI (Body Mass Index)       81 98  F (36.7  C) (Oral) 6' 1\" (1.854 m) 97% 23.75 kg/m2        Blood Pressure from Last 3 Encounters:   04/20/18 116/62   02/19/18 102/66   11/21/17 122/89    Weight from Last 3 Encounters:   04/20/18 180 lb (81.6 kg)   02/19/18 183 lb 6.4 oz (83.2 kg)   11/21/17 181 lb (82.1 kg)              We Performed the Following     TDAP (ADACEL)          Today's Medication Changes          These changes are accurate as of 4/20/18  4:05 PM.  If you have any questions, ask your nurse or doctor.               Start taking these medicines.        Dose/Directions    ciprofloxacin 500 MG tablet   Commonly known as:  CIPRO   Used for:  Hot tub folliculitis   Started by:  Kenan Oliveira MD        Dose:  500 mg   Take 1 tablet (500 mg) by mouth 2 times daily   Quantity:  14 tablet   Refills:  0            Where to get your medicines      These medications were sent to [facility] [place] - [place] - [address]   [address], [place] [address]     Phone:  [phone]     ciprofloxacin 500 MG tablet                Primary Care Provider Office Phone # Fax #    Kenan Oliveira [phone]       [address] [place] [address]        Equal Access to Services     [facility]: Hadii aad ku hadasho Soomaali, waaxda luqadaha, qaybta kaalmada adeegyada, wax edison jade ah. So [facility] [phone].    ATENCIÓN: Si habla español, tiene a mclaughlin disposición servicios gratuitos de asistencia lingüística. Llame al [phone].    We comply with applicable federal civil rights laws and [place] laws. We do not discriminate on the basis of race, color, national origin, age, disability, sex, sexual orientation, or gender identity.            Thank you!     [facility]. Our goal is " always to provide you with excellent care. Hearing back from our patients is one way we can continue to improve our services. Please take a few minutes to complete the written survey that you may receive in the mail after your visit with us. Thank you!             Your Updated Medication List - Protect others around you: Learn how to safely use, store and throw away your medicines at www.disposemymeds.org.          This list is accurate as of 4/20/18  4:05 PM.  Always use your most recent med list.                   Brand Name Dispense Instructions for use Diagnosis    ciprofloxacin 500 MG tablet    CIPRO    14 tablet    Take 1 tablet (500 mg) by mouth 2 times daily    Hot tub folliculitis       OMEPRAZOLE PO      Take 20 mg by mouth every other day

## 2018-04-20 NOTE — NURSING NOTE
Screening Questionnaire for Adult Immunization    Are you sick today?   No   Do you have allergies to medications, food, a vaccine component or latex?   No   Have you ever had a serious reaction after receiving a vaccination?   No   Do you have a long-term health problem with heart disease, lung disease, asthma, kidney disease, metabolic disease (e.g. diabetes), anemia, or other blood disorder?   No   Do you have cancer, leukemia, HIV/AIDS, or any other immune system problem?   No   In the past 3 months, have you taken medications that affect  your immune system, such as prednisone, other steroids, or anticancer drugs; drugs for the treatment of rheumatoid arthritis, Crohn s disease, or psoriasis; or have you had radiation treatments?   No   Have you had a seizure, or a brain or other nervous system problem?   No   During the past year, have you received a transfusion of blood or blood     products, or been given immune (gamma) globulin or antiviral drug?   No   For women: Are you pregnant or is there a chance you could become        pregnant during the next month?   No   Have you received any vaccinations in the past 4 weeks?   No     Immunization questionnaire answers were all negative.        Per orders of Dr. Oliveira, injection of Adacel given by Ruth Marcus. Patient instructed to remain in clinic for 15 minutes afterwards, and to report any adverse reaction to me immediately.       Screening performed by Ruth Marcus on 4/20/2018 at 4:10 PM.

## 2018-04-20 NOTE — PROGRESS NOTES
SUBJECTIVE:   Dean Deleon is a 37 year old male who presents to clinic today for the following health issues:      Rash      Duration: two days    Description  Location: upper to mid legs and up sides, bilaterally  Itching: no, rash is painful    Intensity:  moderate    Accompanying signs and symptoms: None    History (similar episodes/previous evaluation): None    Precipitating or alleviating factors:  New exposures: changed body wash a few weeks ago, but has used in the past with no issues  Recent travel: no      Therapies tried and outcome: none      Is on both sides of legs and up ribcage.  Not itchy, but does hurt to touch.      New bar soap, but has used in the past.      No new oral meds or foods.  Does drink a lot of herbal teas, some of them may be new.  Takes prilosec (omeprazole) every other day; no new laundry soap.      No other household members affected.     Of note, was in a hot tub about 48 hour before rash began.     Problem list and histories reviewed & adjusted, as indicated.  Additional history: as documented    Patient Active Problem List   Diagnosis   (none) - all problems resolved or deleted     Past Surgical History:   Procedure Laterality Date     HC TOOTH EXTRACTION W/FORCEP         Social History   Substance Use Topics     Smoking status: Former Smoker     Packs/day: 0.50     Years: 2.00     Types: Cigarettes     Quit date: 9/6/2004     Smokeless tobacco: Never Used     Alcohol use 4.0 oz/week     8 Standard drinks or equivalent per week      Comment: occ/ about 2-6 drinks about 4/days/week     Family History   Problem Relation Age of Onset     Hypertension Father      Depression Father      DIABETES Paternal Grandfather      type 2     Arthritis Paternal Grandfather      Alzheimer Disease Paternal Grandmother      Arthritis Paternal Grandmother      Arthritis Maternal Grandfather      ELIZAKYARA Maternal Grandmother      Arthritis Maternal Grandmother          Current  "Outpatient Prescriptions   Medication Sig Dispense Refill     OMEPRAZOLE PO Take 20 mg by mouth every other day       No Known Allergies  BP Readings from Last 3 Encounters:   04/20/18 116/62   02/19/18 102/66   11/21/17 122/89    Wt Readings from Last 3 Encounters:   04/20/18 180 lb (81.6 kg)   02/19/18 183 lb 6.4 oz (83.2 kg)   11/21/17 181 lb (82.1 kg)                  Labs reviewed in EPIC    Reviewed and updated as needed this visit by clinical staff       Reviewed and updated as needed this visit by Provider         ROS:  CONSTITUTIONAL: NEGATIVE for fever, chills, change in weight  ENT/MOUTH: NEGATIVE for ear, mouth and throat problems  RESP: NEGATIVE for significant cough or SOB  CV: NEGATIVE for chest pain, palpitations or peripheral edema    OBJECTIVE:                                                    /62 (BP Location: Right arm, Cuff Size: Adult Large)  Pulse 81  Temp 98  F (36.7  C) (Oral)  Ht 6' 1\" (1.854 m)  Wt 180 lb (81.6 kg)  SpO2 97%  BMI 23.75 kg/m2  Body mass index is 23.75 kg/(m^2).   GENERAL: healthy, alert, well nourished, well hydrated, no distress  HENT: ear canals- normal; TMs- normal; Nose- normal; Mouth- no ulcers, no lesions  NECK: no tenderness, no adenopathy, no asymmetry, no masses, no stiffness; thyroid- normal to palpation  RESP: lungs clear to auscultation - no rales, no rhonchi, no wheezes  CV: regular rates and rhythm, normal S1 S2, no S3 or S4 and no murmur, no click or rub -  ABDOMEN: soft, no tenderness, no  hepatosplenomegaly, no masses, normal bowel sounds  Rash: tender and pruritis papules on bathing suit and thorax lines.   Diagnostic test results:  Diagnostic Test Results:  none      ASSESSMENT/PLAN:                                                    1. Need for prophylactic vaccination with tetanus-diphtheria (TD)    - TDAP (ADACEL)    2. Hot tub folliculitis  Exposure and appearance consistent with hot tub folliculitis.    - ciprofloxacin (CIPRO) 500 MG " tablet; Take 1 tablet (500 mg) by mouth 2 times daily  Dispense: 14 tablet; Refill: 0      See Patient Instructions    Kenan Oliveira MD  [facility]

## 2018-04-20 NOTE — PATIENT INSTRUCTIONS
Begin 7 days of twice daily cipro.    Tetanus shot today.    Kenan Oliveira MD  Internal Medicine and Pediatrics

## 2020-02-17 ENCOUNTER — HEALTH MAINTENANCE LETTER (OUTPATIENT)
Age: 40
End: 2020-02-17

## 2020-11-29 ENCOUNTER — HEALTH MAINTENANCE LETTER (OUTPATIENT)
Age: 40
End: 2020-11-29

## 2021-04-10 ENCOUNTER — HEALTH MAINTENANCE LETTER (OUTPATIENT)
Age: 41
End: 2021-04-10

## 2021-09-25 ENCOUNTER — HEALTH MAINTENANCE LETTER (OUTPATIENT)
Age: 41
End: 2021-09-25

## 2022-05-01 ENCOUNTER — HEALTH MAINTENANCE LETTER (OUTPATIENT)
Age: 42
End: 2022-05-01

## 2022-12-26 ENCOUNTER — HEALTH MAINTENANCE LETTER (OUTPATIENT)
Age: 42
End: 2022-12-26

## 2023-06-02 ENCOUNTER — HEALTH MAINTENANCE LETTER (OUTPATIENT)
Age: 43
End: 2023-06-02

## 2024-06-12 ENCOUNTER — TRANSFERRED RECORDS (OUTPATIENT)
Dept: HEALTH INFORMATION MANAGEMENT | Facility: CLINIC | Age: 44
End: 2024-06-12
Payer: COMMERCIAL

## 2024-09-11 ENCOUNTER — TRANSFERRED RECORDS (OUTPATIENT)
Dept: HEALTH INFORMATION MANAGEMENT | Facility: CLINIC | Age: 44
End: 2024-09-11
Payer: COMMERCIAL